# Patient Record
Sex: MALE | Race: WHITE | NOT HISPANIC OR LATINO | Employment: OTHER | URBAN - METROPOLITAN AREA
[De-identification: names, ages, dates, MRNs, and addresses within clinical notes are randomized per-mention and may not be internally consistent; named-entity substitution may affect disease eponyms.]

---

## 2017-01-19 ENCOUNTER — IMPORTED ENCOUNTER (OUTPATIENT)
Dept: URBAN - METROPOLITAN AREA CLINIC 43 | Facility: CLINIC | Age: 82
End: 2017-01-19

## 2017-01-19 PROBLEM — H10.45: Noted: 2017-01-19

## 2017-01-19 PROBLEM — H40.013: Noted: 2017-01-19

## 2017-01-26 ENCOUNTER — IMPORTED ENCOUNTER (OUTPATIENT)
Dept: URBAN - METROPOLITAN AREA CLINIC 43 | Facility: CLINIC | Age: 82
End: 2017-01-26

## 2017-03-15 NOTE — PATIENT DISCUSSION
REVIEWED OPTIONS AND RECOM LASER - AS CAT SURGERY 2 DAYS AGO AND COULD BREAK THROUGH PIGMENT - TO DO WITH INDIRECT AND NO DEPRESSION

## 2017-03-15 NOTE — PROCEDURE NOTE: CLINICAL
PROCEDURE NOTE: Laser for Retinal Tear OD. Diagnosis: Horseshoe Tear. Anesthesia: Topical. Prior to laser, risks/benefits/alternatives to laser discussed including loss of vision, decreased peripheral and night vision, need for more laser and/or surgery and patient wished to proceed. An informed consent was obtained and no assurances or guarantees were given. Spot size: * um. Pulse power: 380 mW. Pulse duration: 150 ms. Number of pulses: 62. Procedure Time: 11:49. Patient tolerated procedure well. There were no complications. Post-op instructions given. Patient given office phone number/answering service number and advised to call immediately should there be loss of vision or pain, or should they have any other questions or concerns. Piotr Yañez

## 2017-03-23 NOTE — PATIENT DISCUSSION
Cataract APPEARS VISUALLY SIGNIFICANT and patient advised to SEE  [facility] for evaluation and treatment.

## 2017-10-26 NOTE — PATIENT DISCUSSION
Cataract APPEARS VISUALLY SIGNIFICANT and patient advised to SEE DR Brianna Ocampo for evaluation and treatment.

## 2017-10-26 NOTE — PATIENT DISCUSSION
Capsular haze appears visually significant, RECOMMEND CONSULT with  DR JOHNSON for possible YAG capsulotomy.

## 2018-03-08 ENCOUNTER — IMPORTED ENCOUNTER (OUTPATIENT)
Dept: URBAN - METROPOLITAN AREA CLINIC 43 | Facility: CLINIC | Age: 83
End: 2018-03-08

## 2018-03-08 PROBLEM — H40.013: Noted: 2018-03-08

## 2018-06-21 ENCOUNTER — IMPORTED ENCOUNTER (OUTPATIENT)
Dept: URBAN - METROPOLITAN AREA CLINIC 43 | Facility: CLINIC | Age: 83
End: 2018-06-21

## 2018-06-21 PROBLEM — H10.45: Noted: 2018-06-21

## 2018-06-21 PROBLEM — H40.013: Noted: 2018-06-21

## 2018-10-04 ENCOUNTER — IMPORTED ENCOUNTER (OUTPATIENT)
Dept: URBAN - METROPOLITAN AREA CLINIC 43 | Facility: CLINIC | Age: 83
End: 2018-10-04

## 2018-10-04 PROBLEM — H40.013: Noted: 2018-10-04

## 2019-06-07 ENCOUNTER — IMPORTED ENCOUNTER (OUTPATIENT)
Dept: URBAN - METROPOLITAN AREA CLINIC 43 | Facility: CLINIC | Age: 84
End: 2019-06-07

## 2019-06-07 PROBLEM — H40.013: Noted: 2019-06-07

## 2019-08-16 ENCOUNTER — TELEPHONE (OUTPATIENT)
Dept: SURGERY | Age: 84
End: 2019-08-16

## 2019-09-17 ENCOUNTER — HOSPITAL ENCOUNTER (EMERGENCY)
Age: 84
Discharge: HOME | End: 2019-09-17
Attending: EMERGENCY MEDICINE

## 2019-09-17 ENCOUNTER — APPOINTMENT (OUTPATIENT)
Dept: CT IMAGING | Age: 84
End: 2019-09-17
Attending: EMERGENCY MEDICINE

## 2019-09-17 VITALS
HEIGHT: 72 IN | OXYGEN SATURATION: 97 % | WEIGHT: 193.34 LBS | RESPIRATION RATE: 31 BRPM | TEMPERATURE: 97.6 F | SYSTOLIC BLOOD PRESSURE: 142 MMHG | DIASTOLIC BLOOD PRESSURE: 73 MMHG | BODY MASS INDEX: 26.19 KG/M2 | HEART RATE: 64 BPM

## 2019-09-17 DIAGNOSIS — R55 SYNCOPE, UNSPECIFIED SYNCOPE TYPE: Primary | ICD-10-CM

## 2019-09-17 LAB
ANION GAP SERPL CALC-SCNC: 15 MMOL/L (ref 10–20)
BASOPHILS # BLD AUTO: 0 K/MCL (ref 0–0.3)
BASOPHILS NFR BLD AUTO: 1 %
BUN SERPL-MCNC: 20 MG/DL (ref 6–20)
BUN/CREAT SERPL: 26 (ref 7–25)
CALCIUM SERPL-MCNC: 8.7 MG/DL (ref 8.4–10.2)
CHLORIDE SERPL-SCNC: 104 MMOL/L (ref 98–107)
CO2 SERPL-SCNC: 25 MMOL/L (ref 21–32)
CREAT SERPL-MCNC: 0.77 MG/DL (ref 0.67–1.17)
DIFFERENTIAL METHOD BLD: ABNORMAL
EOSINOPHIL # BLD AUTO: 0.1 K/MCL (ref 0.1–0.5)
EOSINOPHIL NFR SPEC: 2 %
ERYTHROCYTE [DISTWIDTH] IN BLOOD: 13.7 % (ref 11–15)
GLUCOSE SERPL-MCNC: 117 MG/DL (ref 65–99)
HCT VFR BLD CALC: 38.6 % (ref 39–51)
HGB BLD-MCNC: 13.3 G/DL (ref 13–17)
IMM GRANULOCYTES # BLD AUTO: 0 K/MCL (ref 0–0.2)
IMM GRANULOCYTES NFR BLD: 1 %
LYMPHOCYTES # BLD MANUAL: 1 K/MCL (ref 1–4)
LYMPHOCYTES NFR BLD MANUAL: 12 %
MCH RBC QN AUTO: 32.7 PG (ref 26–34)
MCHC RBC AUTO-ENTMCNC: 34.5 G/DL (ref 32–36.5)
MCV RBC AUTO: 94.8 FL (ref 78–100)
MONOCYTES # BLD MANUAL: 0.8 K/MCL (ref 0.3–0.9)
MONOCYTES NFR BLD MANUAL: 10 %
NEUTROPHILS # BLD: 6 K/MCL (ref 1.8–7.7)
NEUTROPHILS NFR BLD AUTO: 74 %
NRBC BLD MANUAL-RTO: 0 /100 WBC
PLATELET # BLD: 137 K/MCL (ref 140–450)
POTASSIUM SERPL-SCNC: 4.3 MMOL/L (ref 3.4–5.1)
RBC # BLD: 4.07 MIL/MCL (ref 4.5–5.9)
SODIUM SERPL-SCNC: 140 MMOL/L (ref 135–145)
WBC # BLD: 8 K/MCL (ref 4.2–11)

## 2019-09-17 PROCEDURE — 36415 COLL VENOUS BLD VENIPUNCTURE: CPT

## 2019-09-17 PROCEDURE — 80048 BASIC METABOLIC PNL TOTAL CA: CPT

## 2019-09-17 PROCEDURE — 93010 ELECTROCARDIOGRAM REPORT: CPT | Performed by: INTERNAL MEDICINE

## 2019-09-17 PROCEDURE — 99285 EMERGENCY DEPT VISIT HI MDM: CPT | Performed by: EMERGENCY MEDICINE

## 2019-09-17 PROCEDURE — 12011 RPR F/E/E/N/L/M 2.5 CM/<: CPT | Performed by: EMERGENCY MEDICINE

## 2019-09-17 PROCEDURE — 70450 CT HEAD/BRAIN W/O DYE: CPT

## 2019-09-17 PROCEDURE — 70450 CT HEAD/BRAIN W/O DYE: CPT | Performed by: RADIOLOGY

## 2019-09-17 PROCEDURE — 10002526 HB LACERATION REPAIR-SIMPLE

## 2019-09-17 PROCEDURE — 99284 EMERGENCY DEPT VISIT MOD MDM: CPT

## 2019-09-17 PROCEDURE — 93005 ELECTROCARDIOGRAM TRACING: CPT | Performed by: EMERGENCY MEDICINE

## 2019-09-17 PROCEDURE — 85025 COMPLETE CBC W/AUTO DIFF WBC: CPT

## 2019-09-17 PROCEDURE — 10002801 HB RX 250 W/O HCPCS: Performed by: EMERGENCY MEDICINE

## 2019-09-17 RX ORDER — LIDOCAINE HYDROCHLORIDE AND EPINEPHRINE 10; 10 MG/ML; UG/ML
3 INJECTION, SOLUTION INFILTRATION; PERINEURAL ONCE
Status: COMPLETED | OUTPATIENT
Start: 2019-09-17 | End: 2019-09-17

## 2019-09-17 RX ADMIN — LIDOCAINE HYDROCHLORIDE,EPINEPHRINE BITARTRATE 3 ML: 10; .01 INJECTION, SOLUTION INFILTRATION; PERINEURAL at 19:55

## 2019-09-17 ASSESSMENT — ENCOUNTER SYMPTOMS
COUGH: 0
SPEECH DIFFICULTY: 0
NAUSEA: 0
ABDOMINAL PAIN: 0
DIZZINESS: 0
FEVER: 0
VOMITING: 0
DIARRHEA: 0
LIGHT-HEADEDNESS: 0
WEAKNESS: 0
HEADACHES: 0
NUMBNESS: 0
SHORTNESS OF BREATH: 0
BACK PAIN: 0

## 2019-09-17 ASSESSMENT — PAIN SCALES - GENERAL
PAINLEVEL_OUTOF10: 0
PAINLEVEL_OUTOF10: 0

## 2019-09-17 ASSESSMENT — MOVEMENT AND STRENGTH ASSESSMENTS
FACE_JAW: NO FACIAL DROOP
FACE_JAW: NO FACIAL DROOP

## 2019-09-19 ENCOUNTER — APPOINTMENT (OUTPATIENT)
Dept: CARDIOLOGY | Age: 84
End: 2019-09-19
Attending: EMERGENCY MEDICINE

## 2019-09-19 ENCOUNTER — OFFICE VISIT (OUTPATIENT)
Dept: CARDIOLOGY | Age: 84
End: 2019-09-19
Attending: EMERGENCY MEDICINE

## 2019-09-19 VITALS
SYSTOLIC BLOOD PRESSURE: 142 MMHG | WEIGHT: 193.56 LBS | HEIGHT: 72 IN | DIASTOLIC BLOOD PRESSURE: 78 MMHG | BODY MASS INDEX: 26.22 KG/M2 | HEART RATE: 80 BPM

## 2019-09-19 DIAGNOSIS — R55 SYNCOPE AND COLLAPSE: Primary | ICD-10-CM

## 2019-09-19 DIAGNOSIS — R94.31 ABNORMAL ECG: ICD-10-CM

## 2019-09-19 LAB
ATRIAL RATE (BPM): 63
P AXIS (DEGREES): 41
PR-INTERVAL (MSEC): 198
QRS-INTERVAL (MSEC): 100
QT-INTERVAL (MSEC): 444
QTC: 454
R AXIS (DEGREES): 66
REPORT TEXT: NORMAL
T AXIS (DEGREES): 63
VENTRICULAR RATE EKG/MIN (BPM): 63

## 2019-09-19 PROCEDURE — 93000 ELECTROCARDIOGRAM COMPLETE: CPT | Performed by: INTERNAL MEDICINE

## 2019-09-19 PROCEDURE — 99214 OFFICE O/P EST MOD 30 MIN: CPT | Performed by: INTERNAL MEDICINE

## 2019-09-19 ASSESSMENT — ENCOUNTER SYMPTOMS
UNEXPECTED WEIGHT CHANGE: 0
CHEST TIGHTNESS: 0
APNEA: 0
APPETITE CHANGE: 0
ACTIVITY CHANGE: 0
FATIGUE: 0
NUMBNESS: 0
SHORTNESS OF BREATH: 0
DIZZINESS: 0
LIGHT-HEADEDNESS: 0

## 2019-09-20 ENCOUNTER — TELEPHONE (OUTPATIENT)
Dept: CARDIOLOGY | Age: 84
End: 2019-09-20

## 2019-09-20 ENCOUNTER — APPOINTMENT (OUTPATIENT)
Dept: NUCLEAR MEDICINE | Age: 84
End: 2019-09-20
Attending: INTERNAL MEDICINE

## 2019-09-24 ENCOUNTER — APPOINTMENT (OUTPATIENT)
Dept: NUCLEAR MEDICINE | Age: 84
End: 2019-09-24
Attending: INTERNAL MEDICINE

## 2019-09-24 ENCOUNTER — WALK IN (OUTPATIENT)
Dept: URGENT CARE | Age: 84
End: 2019-09-24

## 2019-09-24 VITALS
RESPIRATION RATE: 16 BRPM | BODY MASS INDEX: 26.58 KG/M2 | DIASTOLIC BLOOD PRESSURE: 70 MMHG | WEIGHT: 196 LBS | SYSTOLIC BLOOD PRESSURE: 138 MMHG | OXYGEN SATURATION: 95 % | HEART RATE: 85 BPM | TEMPERATURE: 96.8 F

## 2019-09-24 DIAGNOSIS — Z48.02 VISIT FOR SUTURE REMOVAL: Primary | ICD-10-CM

## 2019-09-24 PROCEDURE — 99213 OFFICE O/P EST LOW 20 MIN: CPT | Performed by: EMERGENCY MEDICINE

## 2019-09-27 ENCOUNTER — APPOINTMENT (OUTPATIENT)
Dept: ULTRASOUND IMAGING | Age: 84
End: 2019-09-27
Attending: INTERNAL MEDICINE

## 2020-04-18 ASSESSMENT — VISUAL ACUITY
OS_SC: J1+
OS_CC: J1
OS_SC: 20/60
OD_SC: 20/50 -2
OD_SC: J20/400
OD_SC: 20/30+1
OD_PH: 20/20 -
OS_PH: 20/30+1
OD_SC: 20/30+1
OS_SC: 20/20 -2
OD_SC: J7
OS_SC: 20/50
OS_SC: 20/70-1
OS_SC: 20/70-1
OD_SC: 20/30+1
OD_CC: J1
OS_SC: J1+
OS_SC: 20/70
OS_SC: J1
OD_SC: 20/20 -2
OD_CC: 20/20
OS_SC: J1+/-
OD_SC: J7-2
OD_SC: 20/50
OS_CC: 20/20-3
OD_SC: 20/20-1
OD_CC: 20/20 +2
OS_CC: 20/20 -2
OS_PH: 20/30-1

## 2020-04-18 ASSESSMENT — TONOMETRY
OD_IOP_MMHG: 11.0
OD_IOP_MMHG: 14.0
OD_IOP_MMHG: 13.0
OS_IOP_MMHG: 15.0
OD_IOP_MMHG: 14.0
OS_IOP_MMHG: 12.0
OD_IOP_MMHG: 14.0
OS_IOP_MMHG: 14.0
OD_IOP_MMHG: 14.0

## 2020-04-18 ASSESSMENT — KERATOMETRY
OS_AXISANGLE2_DEGREES: 169
OD_K1POWER_DIOPTERS: 43.5
OD_K2POWER_DIOPTERS: 43
OS_AXISANGLE_DEGREES: 79
OD_AXISANGLE_DEGREES: 74
OS_K2POWER_DIOPTERS: 42.75
OS_K1POWER_DIOPTERS: 43
OD_AXISANGLE2_DEGREES: 164

## 2020-06-12 ENCOUNTER — ESTABLISHED COMPREHENSIVE EXAM (OUTPATIENT)
Dept: URBAN - METROPOLITAN AREA CLINIC 32 | Facility: CLINIC | Age: 85
End: 2020-06-12

## 2020-06-12 DIAGNOSIS — H40.013: ICD-10-CM

## 2020-06-12 PROCEDURE — 92014 COMPRE OPH EXAM EST PT 1/>: CPT

## 2020-06-12 PROCEDURE — 92133 CPTRZD OPH DX IMG PST SGM ON: CPT

## 2020-06-12 ASSESSMENT — KERATOMETRY
OS_AXISANGLE_DEGREES: 79
OS_K2POWER_DIOPTERS: 42.75
OD_AXISANGLE_DEGREES: 74
OS_K1POWER_DIOPTERS: 43
OD_K2POWER_DIOPTERS: 43
OD_K1POWER_DIOPTERS: 43.5
OD_AXISANGLE2_DEGREES: 164
OS_AXISANGLE2_DEGREES: 169

## 2020-06-12 ASSESSMENT — VISUAL ACUITY
OD_CC: 20/20
OD_CC: J1+
OS_CC: 20/20-2
OS_CC: J1+

## 2020-06-12 ASSESSMENT — TONOMETRY
OD_IOP_MMHG: 17
OS_IOP_MMHG: 17